# Patient Record
(demographics unavailable — no encounter records)

---

## 2019-07-25 NOTE — DIAGNOSTIC IMAGING REPORT
DATE: 7/25/2019 11:55 AM 



REASON FOR EXAM:  ABD PAIN.  Evaluate for appendicitis.



COMPARISON:  None



TECHNIQUE: Focused abdominal sonogram in the right lower

quadrant.



FINDINGS: The appendix is not visualized on this abdominal

ultrasound due to overlying bowel gas. However, no secondary

signs of acute appendicitis are visualized. No free fluid or

loculated fluid collections.



IMPRESSION: 

1. Nonvisualization of the appendix without secondary signs of

acute appendicitis. If continued clinical concern, recommend CT

abdomen and pelvis to further characterize.



Dictated by: 



  Dictated on workstation # [id number]

## 2019-07-26 NOTE — DIAGNOSTIC IMAGING REPORT
PROCEDURE: CT urinary tract, rule out kidney stone.



TECHNIQUE: Multiple contiguous axial images were obtained through

the abdomen and pelvis without the use of intravenous contrast.

Auto Exposure Controls were utilized during the CT exam to meet

ALARA standards for radiation dose reduction. 



INDICATION:  Abdominal pain.



FINDINGS: No comparison available.



Limited views of the lower thorax are normal.



Liver is normal. No focal liver lesions are seen. Gallbladder is

normal. No biliary ductal dilation. Pancreas, spleen and adrenal

glands are normal.



There is an obstructing 3 mm distal right ureteral stone with

mild right-sided hydroureter. No left-sided stones are seen.

Urinary bladder is normal. Uterus and adnexa are normal.



There are no dilated loops of large or small bowel. Appendix is

normal. No abdominal or pelvic lymphadenopathy. No free fluid or

air.



There are no suspicious osseous lesions. There are bilateral L5

pars defects.



IMPRESSION:

1. Obstructing 3 mm distal right ureteral stone with mild

right-sided hydroureter.









Dictated by: 



  Dictated on workstation # [id number]

## 2019-07-26 NOTE — DIAGNOSTIC IMAGING REPORT
INDICATION: Abdominal pain.



COMPARISON: None



FINDINGS: Supine and upright views of the abdomen show a

nondistended bowel gas pattern. Moderate colonic air and stool is

noted. No abnormal air fluid levels or free intraperitoneal air

is seen. No abnormal extraosseous calcifications are seen.

Osseous structures show moderate levoscoliotic deformity of the

lumbar spine with mild compensatory dextroscoliosis of the

thoracic spine. No organomegaly is identified.



Accompanying upright chest shows normal heart size and pulmonary

vascularity. The lungs are well aerated and clear. The

mediastinum is normal in appearance. 



IMPRESSION:   

1.  No bowel obstruction or free air.  

2.  Normal chest. No pneumonia or pulmonary edema.

3.  S shape scoliotic deformity of the thoracolumbar spine.



Dictated by: 



  Dictated on workstation # [id number]

## 2019-07-26 NOTE — ED ABDOMINAL PAIN
General


Chief Complaint:  Back Problems


Stated Complaint:  ABD PAIN


Nursing Triage Note:  


C/O RIGHT FLANK PAIN


Sepsis Screen:  No Definite Risk


Source of Information:  Patient





History of Present Illness


Date Seen by Provider:  2019


Time Seen by Provider:  04:49


Initial Comments


PT ARRIVES VIA POV FROM HOME


STATES SHE WOKE UP AT 0500 YESTERDAY WITH PAIN IN RLQ, RADIATING TO RIGHT GROIN 

AREA


PAIN COMES AND GOES, NOTHING WORSENS OR IMPROVES PAIN--BUT HAS NOT TAKEN 

ANYTHING FOR PAIN 


STATES SHE WOKE UP THIS AM AT 0400 AND PAIN WAS MUCH WORSE AND IS NOW MOSTLY IN 

RIGHT FLANK PAIN AND IS CONSTANT


NOW HAVING NAUSEA


NO FEVER


NO URINARY SYMPTOMS 


LMP--SOMETIME IN . NO BIRTH CONTROL. PT IS . 





PT WENT TO [facility] YESTERDAY AND HAD OUTPATIENT ULTRASOUND OF 

APPENDIX ONLY--WAS NON-DIAGNOSTIC. ( PT DID GIVE A DIFFERENT NAME AT THAT 

TIME--TONY DOHERTY--UNABLE TO FIND PT IN COMPUTER UNDER THAT NAME OR TONY CADET, BUT [employer] WAS ABLE TO RETRIEVE THE ULTRASOUND REPORT )





PCP: DR. SANCHEZ'S NP, JONATHAN LANG





Allergies and Home Medications


Allergies


Coded Allergies:  


     Penicillins (Verified  Allergy, Unknown, 19)





Patient Home Medication List


Home Medication List Reviewed:  Yes





Review of Systems


Review of Systems


Constitutional:  no symptoms reported


Respiratory:  No Symptoms Reported


Cardiovascular:  No Symptoms Reported


Gastrointestinal:  See HPI, Abdominal Pain; Denies Constipated, Denies Diarrhea;

Nausea; Denies Vomiting


Genitourinary:  Flank Pain


Musculoskeletal:  see HPI, back pain


Skin:  no symptoms reported


Psychiatric/Neurological:  No Symptoms Reported


Endocrine:  No Symptoms Reported


Hematologic/Lymphatic:  No Symptoms Reported





Past Medical-Social-Family Hx


Patient Social History


Alcohol Use:  Denies Use


Recreational Drug Use:  No


Smoking Status:  Never a Smoker


2nd Hand Smoke Exposure:  No


Recent Foreign Travel:  No


Contact w/Someone Who Travel:  No


Recent Infectious Disease Expo:  No


Recent Hopitalizations:  No


Physical Abuse:  No


Sexual Abuse:  No


Mistreated:  No


Fear:  No





Seasonal Allergies


Seasonal Allergies:  No





Past Medical History


Surgeries:  No


Respiratory:  No


Cardiac:  No


Neurological:  No


Pregnant:  No


Last Menstrual Period:  2019


Hx :  0


Reproductive Disorders:  No (TAKES SPIRONOLACTONE FOR UNKNOWN REASON--PRESCRIBED

BY NP JONATHAN SANCHEZ)


Genitourinary:  No


Gastrointestinal:  No


Musculoskeletal:  No


Endocrine:  No


HEENT:  No


Cancer:  No


Psychosocial:  No


Integumentary:  No


Blood Disorders:  No





Physical Exam


Vital Signs





Vital Signs - First Documented








 19





 04:51


 


Temp 97.9


 


Pulse 81


 


Resp 18


 


B/P (MAP) 132/85 (101)


 


Pulse Ox 97





Capillary Refill : Less Than 3 Seconds


Height/Weight/BMI


Height: 5'1.00"


Weight: 152lbs. oz. 68.610929gf;  BMI


Method:Stated


General Appearance:  WD/WN, no apparent distress


Respiratory:  normal breath sounds, no respiratory distress, no accessory muscle

use


Cardiovascular:  regular rate, rhythm, no murmur


Gastrointestinal:  normal bowel sounds, soft, no organomegaly, no pulsatile 

mass; No distended, No guarding, No rebound; tenderness (MILD RLQ TENDERNESS, MO

DERATE RIGHT FLANK TENDERNESS); No hernia, No mass


Extremities:  normal inspection


Back:  CVA tenderness (R)


Neurologic/Psychiatric:  CNs II-XII nml as tested, no motor/sensory deficits, 

alert, normal mood/affect, oriented x 3


Skin:  normal color, warm/dry; No rash





Progress/Results/Core Measures


Results/Orders


Lab Results





Laboratory Tests








Test


 19


04:58 19


05:05 Range/Units


 


 


White Blood Count


 


 8.9 


 4.3-11.0


10^3/uL


 


Red Blood Count


 


 4.66 


 4.35-5.85


10^6/uL


 


Hemoglobin  13.1  11.5-16.0  G/DL


 


Hematocrit  40  35-52  %


 


Mean Corpuscular Volume  86  80-99  FL


 


Mean Corpuscular Hemoglobin  28  25-34  PG


 


Mean Corpuscular Hemoglobin


Concent 


 33 


 32-36  G/DL





 


Red Cell Distribution Width  12.7  10.0-14.5  %


 


Platelet Count


 


 297 


 130-400


10^3/uL


 


Mean Platelet Volume  10.6 H 7.4-10.4  FL


 


Neutrophils (%) (Auto)  50  42-75  %


 


Lymphocytes (%) (Auto)  39  12-44  %


 


Monocytes (%) (Auto)  10  0-12  %


 


Eosinophils (%) (Auto)  2  0-10  %


 


Basophils (%) (Auto)  0  0-10  %


 


Neutrophils # (Auto)  4.4  1.8-7.8  X 10^3


 


Lymphocytes # (Auto)  3.5  1.0-4.0  X 10^3


 


Monocytes # (Auto)  0.9  0.0-1.0  X 10^3


 


Eosinophils # (Auto)


 


 0.1 


 0.0-0.3


10^3/uL


 


Basophils # (Auto)


 


 0.0 


 0.0-0.1


10^3/uL


 


Sodium Level  142  135-145  MMOL/L


 


Potassium Level  3.6  3.6-5.0  MMOL/L


 


Chloride Level  105    MMOL/L


 


Carbon Dioxide Level  23  21-32  MMOL/L


 


Anion Gap  14  5-14  MMOL/L


 


Blood Urea Nitrogen  13  7-18  MG/DL


 


Creatinine


 


 0.77 


 0.60-1.30


MG/DL


 


Estimat Glomerular Filtration


Rate 


 > 60 


  





 


BUN/Creatinine Ratio  17   


 


Glucose Level  103    MG/DL


 


Calcium Level  10.3 H 8.5-10.1  MG/DL


 


Corrected Calcium  10.0  8.5-10.1  MG/DL


 


Total Bilirubin  0.3  0.1-1.0  MG/DL


 


Aspartate Amino Transf


(AST/SGOT) 


 24 


 5-34  U/L





 


Alanine Aminotransferase


(ALT/SGPT) 


 25 


 0-55  U/L





 


Alkaline Phosphatase  72    U/L


 


Total Protein  7.6  6.4-8.2  GM/DL


 


Albumin  4.4  3.2-4.5  GM/DL


 


Amylase Level  66    U/L


 


Lipase  23  8-78  U/L


 


Serum Pregnancy Test,


Qualitative 


 NEGATIVE 


 NEGATIVE  











My Orders





Orders - AMAN SMITH DO


Urine Pregnancy Bedside (19 04:49)


Drug Screen Stat (Urine) (19 04:49)


Ua Culture If Indicated (19 04:49)


Ed Iv/Invasive Line Start (19 04:54)


Ct Abd/Pelvis Wo(Kidney Stone) (19 04:54)


Acute Abd Series (19 04:54)


Amylase (19 04:54)


Cbc With Automated Diff (19 04:54)


Comprehensive Metabolic Panel (19 04:54)


Lipase (19 04:54)


Ed Iv/Invasive Line Start (19 04:54)


Lactated Ringers (Lr 1000 Ml Iv Solution (19 04:54)


Hcg,Qualitative Serum (19 05:13)


Ketorolac Injection (Toradol Injection) (19 05:30)


Ondansetron Injection (Zofran Injectio (19 05:30)





Medications Given in ED





Current Medications








 Medications  Dose


 Ordered  Sig/Emilio


 Route  Start Time


 Stop Time Status Last Admin


Dose Admin


 


 Ketorolac


 Tromethamine  30 mg  ONCE  ONCE


 IVP  19 05:30


 19 05:32 DC 19 05:40


30 MG


 


 Lactated Ringer's  1,000 ml @ 


 0 mls/hr  Q0M ONCE


 IV  19 04:54


 19 04:56 DC 19 05:22


1,000 MLS/HR


 


 Ondansetron HCl  8 mg  ONCE  ONCE


 IVP  19 05:30


 19 05:32 DC 19 05:40


8 MG








Vital Signs/I&O











 19





 04:51


 


Temp 97.9


 


Pulse 81


 


Resp 18


 


B/P (MAP) 132/85 (101)


 


Pulse Ox 97














Blood Pressure Mean:                    101











Progress


Progress Note :  


Progress Note


BEGAN VOMITING SHORTLY AFTER ARRIVAL. GAVE ZOFRAN, AND THEN TORADOL FOR PAIN, 

AFTER RETURN OF NEGATIVE PREGNANCY TEST. 


NAUSEA/VOMITING RESOLVED WITH ZOFRAN


MODERATE RELIEF OF PAIN WITH TORADOL


GIVEN FENTANYL WITH FURTHER IMPROVEMENT IN PAIN





Diagnostic Imaging





Comments


ABDOMEN XRAYS--NO ACUTE PROCESS, PER RADIOLOGIST REPORT AT 0610


CT ABDOMEN / PELVIS--3 MM RIGHT DISTAL URETERAL STONE WITH MILD RIGHT SIDED 

HYDRONEPHROSIS AND MILD PERINEPHRIC AND PERIURETERAL STRANDING. --PER [facility] 

VIA FAX AT 0625


   Reviewed:  Reviewed by Me





Departure


Impression





   Primary Impression:  


   Right ureteral calculus


Disposition:   HOME, SELF-CARE


Condition:  Improved





Departure-Patient Inst.


Referrals:  


LAWANDA SANCHEZ DO (PCP)


Primary Care Physician








MARGOT SANCHEZ, LUCIUS (Family)


Primary Care Physician








TAWIL,ELIAS A MD


Patient Instructions:  How to Strain Your Urine, Kidney Stones (DC)





Add. Discharge Instructions:  


LOTS OF CLEAR LIQUIDS-DRINK ENOUGH SO YOU ARE URINATING EVERY 2-3 HOURS WHILE 

AWAKE





STRAIN ALL URINE--RETURN ANY STONES TO YOUR DR'S OFFICE





FOLLOW UP WITH DR. TAWIL OR YOUR DR ON MONDAY FOR FURTHER CARE


RETURN TO ER IF WORSE





All discharge instructions reviewed with patient and/or family. Voiced 

understanding.


Scripts


Tamsulosin HCl (Flomax) 0.4 Mg Cap


0.4 MG PO DAILY, #10 CAP


   Prov: AMAN SMITH DO         19 


Ondansetron (Ondansetron Odt) 8 Mg Tab.rapdis


8 MG PO Q6H for Nausea/Vomiting, #10 TAB


   Prov: AMAN SMITH DO         19 


Hydrocodone/Ibuprofen (Hydrocodone-Ibuprofen 7.5-200) 1 Each Tablet


1 EACH PO Q4H PRN for PAIN-MODERATE for 3 Days, #20 TAB


   Prov: AMAN SMITH DO         19 


Nitrofurantoin Monohyd/M-Cryst (Macrobid 100 mg Capsule) 100 Mg Capsule


100 MG PO BID, #20 CAP


   Prov: AMAN SMITH DO         19











AMAN SMITH DO                 2019 06:03

## 2019-07-30 NOTE — DIAGNOSTIC IMAGING REPORT
INDICATION: Right ureteral stone.



TIME OF EXAM: 1:45 PM



COMPARISON: No prior studies are available for comparison.



FINDINGS:  

Moderate stool in the colon is seen. No definite calcific

densities are identified overlying the renal shadows. There is a

right pelvic calcification somewhat medially located measuring

approximately 2 mm in size. This may represent a ureteral

calculus. No other significant abnormality is seen.



IMPRESSION:

Questionable distal right ureteral calculus. The study is

otherwise unremarkable.



Dictated by: 



  Dictated on workstation # [id number]

## 2019-07-31 NOTE — XMS REPORT
[facility]

                             Created on: 2018



Nazia rByan

External Reference #: [id number]

: 1998

Sex: Female



Demographics







                          Address                   [address] [place]  [address]

 

                          Preferred Language        Unknown

 

                          Marital Status            Unknown

 

                          Pentecostalism Affiliation     Unknown

 

                          Race                      Unknown

 

                          Ethnic Group              Unknown





Author







                          Author                    JADYN ROBIN

 

                          [facility]

 

                          Address                   [address] [place]  [address]



 

                          Phone                     [phone]







Care Team Providers







                    Care Team Member Name    Role                Phone

 

                    JADYN ROBIN    Unavailable         [phone]







PROBLEMS







          Type      Condition    ICD9-CM Code    MJL89-ED Code    Onset Dates    Condition Status    SNOMED

 Code

 

           Problem    Counseling on other sexually transmitted diseases    V65.45                           Active

                                        [id number]

 

          Problem    Irregular menstrual cycle    626.4                         Active    [id number]

 

          Problem    Scoliosis (and kyphoscoliosis), idiopathic    737.30                        Active    25463936



 

                    Problem             General counseling for initiation of other contraceptive measures    V25.02

                                                Active          [id number]

 

          Problem    Unspecified contraceptive management    V25.9                         Active    [id number]

 

          Problem    DTAP TEST    V06.1                         Active     

 

                Problem         Other general medical examination for administrative purposes    V70.3            

                                        Active              [id number]







ALLERGIES

No Known Allergies



ENCOUNTERS







                Encounter       Location        Date            Diagnosis

 

                          [facility]    [address], [place] [address]

                          11 Mar, 2018              Bilateral impacted cerumen H61.23

 

                          [facility]     [id number] [place] [id number]

                                         

 

                          [facility]     [id number] [place] [phone]

                                         

 

                          [facility]     [id number] [place] [id number]

                          25 Oct, 2013               

 

                          [facility]     [id number] [place] [id number]

                          25 Oct, 2013               

 

                          [facility]     [id number] [place] [phone]

                                         

 

                          [facility]     [id number] [place] [phone]

                          25 Sep, 2012               

 

                          [facility]     [id number] [address]

                          19 Sep, 2012               

 

                          [facility]     [address] [facility] [id number] [address]

                                         







IMMUNIZATIONS

No Known Immunizations



SOCIAL HISTORY

Never Assessed



REASON FOR VISIT

decreased hearing left ear about 1 week JStrasserRN



PLAN OF CARE







                          Activity                  Details









                                         









                          Follow Up                 prn Reason:







VITAL SIGNS







                    Height              62.5 in             2018

 

                    Weight              143.6 lbs           2018

 

                    Temperature         98.5 degrees Fahrenheit    2018

 

                    Heart Rate          80 bpm              2018

 

                    Respiratory Rate    20                  2018

 

                    BMI                 25.84 kg/m2         2018

 

                    Blood pressure systolic    100 mmHg            2018

 

                    Blood pressure diastolic    62 mmHg             2018







MEDICATIONS

No Known Medications



RESULTS

No Results



PROCEDURES







                Procedure       Date Ordered    Result          Body Site

 

                EAR LAVAGE      2018      N/A              







INSTRUCTIONS





MEDICATIONS ADMINISTERED

No Known Medications

## 2019-07-31 NOTE — DISCHARGE INST-UROLOGY
Discharge Inst-Urology


Reconcile Patient Problems


Problems Reviewed?:  Yes





Discharge Medications


New, Converted, or Re-newed RX:  RX on Chart





Patient Instructions/Follow Up


Plan


Please make appointment to been seen in office in 4 weeks.





Increase oral fluids for 48 hours and then as needed.





Diet and Activity as tolerated.





If questions or concerns contact your physician 


Or seek help at emergency department.











TAWIL,ELIAS A MD               Jul 31, 2019 11:00

## 2019-07-31 NOTE — OPERATIVE REPORT
DATE OF SERVICE:  07/31/2019



PREOPERATIVE DIAGNOSIS:

Right distal ureteral stone.



POSTOPERATIVE DIAGNOSIS:

Right distal ureteral stone.



OPERATION PERFORMED:

Right ureteroscopy, passage of the basket and retrograde urogram.



SURGEON:

Elias Tawil, MD



ANESTHESIA:

General.



COMPLICATIONS:

None.



DESCRIPTION OF PROCEDURE:

Under satisfactory general anesthesia, the patient in lithotomy position,

genitalia were prepped and draped in the usual sterile fashion.  Cystoscope was

introduced under vision.  The bladder was normal with bilateral clear effluxes. 

Using the foroblique lens, I dilated the right ureteral orifice intramural

portion to accommodate a 6.9 Slov[place] semirigid ureteroscope, went up to the level

where the stone was presumably and beyond it.  I could not visualize any stone. 

I went ahead and backed up and passed the basket, Angulo basket under vision and

fluoroscopy three or four times, met no resistance and no stones.  I injected

contrast through the ureteroscope.  The contrast flew very easily to the kidney.

 There was no filling defect and complete emptying of the system on withdrawing

the ureteroscope.  I discontinued further attempt.  Introduced the cystoscope

again to empty the bladder.  The patient tolerated the procedure and anesthesia

well and was sent to recovery room in stable condition.



PLAN:

I told to the  and her mother-in-law that she either passed the stone or

the stone moved up.  We have to see how she does over the next few days.  Her

ureter now was dilated and if there is still stone there it is easier to pass. 

We will see how she does in 4 weeks.  She is to call or come to the emergency

room with any problems.





Job ID: [id number]

DocumentID: [id number]

Dictated Date:  07/31/2019 13:04:24

Transcription Date: 07/31/2019 17:31:01

Dictated By: ELIAS TAWIL, MD

## 2019-07-31 NOTE — XMS REPORT
[facility]

                             Created on: 2019



Nazia Bryan

External Reference #: [id number]

: 1998

Sex: Female



Demographics







                          Address                   [address]

[place]  [address]

 

                          Preferred Language        Unknown

 

                          Marital Status            Unknown

 

                          Spiritism Affiliation     Unknown

 

                          Race                      Unknown

 

                          Ethnic Group              Unknown





Author







                          Author                    JASMINE CALDWELL

 

                          Organization              [facility] [place]

 

                          Address                   [address] [place]  [address]



 

                          Phone                     [phone]







Care Team Providers







                    Care Team Member Name    Role                Phone

 

                    JASMINE CALDWELL    Unavailable         [phone]







PROBLEMS







          Type      Condition    ICD9-CM Code    OTC93-GO Code    Onset Dates    Condition Status    SNOMED

 Code

 

           Problem    Counseling on other sexually transmitted diseases    V65.45                           Active

                                        [id number]

 

          Problem    Scoliosis (and kyphoscoliosis), idiopathic    737.30                        Active    [id number]



 

          Problem    Irregular menstrual cycle    626.4                         Active    [id number]

 

          Problem    Unspecified contraceptive management    V25.9                         Active    [id number]

 

                    Problem             General counseling for initiation of other contraceptive measures    V25.02

                                                Active          [id number]

 

                Problem         Other general medical examination for administrative purposes    V70.3            

                                        Active              [id number]

 

          Problem    DTAP TEST    V06.1                         Active     







ALLERGIES

No Information



ENCOUNTERS







                Encounter       Location        Date            Diagnosis

 

                          [facility]    [id number], [place] [address]

                          11 Mar, 2018              Bilateral impacted cerumen H61.23

 

                          [facility] [place]     [id number] [place] [phone]

                                         

 

                          [facility] [place]     [id number] [address]

                                         

 

                          [facility] [place]     [id number] [place] [phone]

                          25 Oct, 2013               

 

                          [facility] [place]     [address] [place] [phone]

                          25 Oct, 2013               

 

                          [facility] [place]     [id number] [place] [phone]

                                         

 

                          [facility] [place]     [id number] [address]

                          25 Sep, 2012               

 

                          [facility] [place]     [id number] [place] [address]

                          19 Sep, 2012               

 

                          [facility] [place]     [address] [facility] [id number] [place] [address]

                                         







IMMUNIZATIONS

No Known Immunizations



SOCIAL HISTORY

Never Assessed



REASON FOR VISIT





PLAN OF CARE





VITAL SIGNS







                    Height              61.2 in             2012

 

                    Weight              115.12 lbs          2012

 

                    Temperature         98.4 degrees Fahrenheit    2012

 

                    Heart Rate          87 bpm              2012

 

                    Respiratory Rate    16                  2012

 

                    Blood pressure systolic    104 mmHg            2012

 

                    Blood pressure diastolic    57 mmHg             2012







MEDICATIONS

No Known Medications



RESULTS

No Results



PROCEDURES







                Procedure       Date Ordered    Result          Body Site

 

                AUDIOMETRY-SCREEN    2012                     







INSTRUCTIONS





MEDICATIONS ADMINISTERED

No Known Medications

## 2019-07-31 NOTE — PROGRESS NOTE-PRE OPERATIVE
Pre-Operative Progress Note


H&P Reviewed


The H&P was reviewed, patient examined and no changes noted.


Date Seen by Provider:  Jul 31, 2019


Time Seen by Provider:  10:57


Date H&P Reviewed:  Jul 31, 2019


Time H&P Reviewed:  10:57


Pre-Operative Diagnosis:  RT DISTAL URETERAL STONE











TAWIL,ELIAS A MD               Jul 31, 2019 10:57

## 2019-07-31 NOTE — ANESTHESIA-GENERAL POST-OP
General


Patient Condition


Mental Status/LOC:  Same as Preop


Cardiovascular:  Satisfactory


Nausea/Vomiting:  Absent


Respiratory:  Satisfactory


Pain:  Controlled


Complications:  Absent





Post Op Complications


Complications


None





Follow Up Care/Instructions


Patient Instructions


None needed.





Anesthesia/Patient Condition


Patient Condition


Patient was seen after the procedure and she was doing well, no complaints, 

stable vital signs, no apparent adverse anesthesia problems.











MARY KAY DUGAN DO         Jul 31, 2019 14:09

## 2019-07-31 NOTE — XMS REPORT
Continuity of Care Document

                             Created on: 2019



TONY DOHERTY

External Reference #: [id number]

: 1998

Sex: Female



Demographics







                          Address                   [address]

 

                          Home Phone                [phone] x

 

                          Preferred Language        Unknown

 

                          Marital Status            Unknown

 

                          Nondenominational Affiliation     Unknown

 

                          Race                      Unknown

 

                          Ethnic Group              Unknown





Author







                          Organization              Unknown

 

                          Address                   Unknown

 

                          Phone                     Unavailable



              



Allergies

      





             Active              Description              Code              Type              Severity

                Reaction              Onset              Reported/Identified              Relationship

 to Patient                             Clinical Status        

 

             Yes              Penicillins                             Drug Allergy                   

                                           2009                                      

 

             Yes              Penicillins                             Drug Allergy              N/A  

             N/A                             2009                                     

 



                    



Medications

      



There is no data.                  



Problems

      





             Date Dx Coded              Attending              Type              Code              Diagnosis

                                        Diagnosed By        

 

             2008                                            V06.5              Dt, Tetanus-diphtheria

 [td]                                            

 

             2008              BANDAR BARRERA DO                             V06.5              

Dt, Tetanus-diphtheria [td]                       

 

                2008              SRIDHAR WOODALL MD                              V06.5         

                          Dt, Tetanus-diphtheria [td]                       

 

             2009                                            V20.2              Preventive Medicine

 New Patient Evaluation Childhood 5-11                       

 

             2009              BANDAR BARRERA DO                             V20.2              

Preventive Medicine New Patient Evaluation Childhood 5-11                       

 

                2009              SRIDHAR WOODALL MD                              V20.2         

                          Preventive Medicine New Patient Evaluation Childhood 5-11                       



 

             2010                                            V01.84              Meningococcal 

Vaccine                                          

 

             2010                                            V05.3              Hepatitis Viral/all

                                                 

 

                2010              BANDAR BARRERA DO                              V01.84           

                          Meningococcal Vaccine                       

 

             2010              BANDAR BARRERA DO                             V05.3              

Hepatitis Viral/all                              

 

                2010              SRIDHAR WOODALL MD                              V01.84        

                          Meningococcal Vaccine                       

 

                2010              SRIDHAR WOODALL MD                              V05.3         

                          Hepatitis Viral/all                       

 

             2012                                            626.4              IRREGULAR MENSTRUAL

 CYCLE                                           

 

             2012                                            V25.02              CONTRACEPTION 

- ANY METHOD                                     

 

             2012                                            V65.45              Counseling - Std

                                                 

 

             2012              BANDAR BARRERA DO                             626.4              

IRREGULAR MENSTRUAL CYCLE                        

 

                2012              BANDAR BARRERA DO                              V25.02           

                          CONTRACEPTION - ANY METHOD                       

 

                2012              BANDAR BARRERA DO                              V65.45           

                          Counseling - Std                       

 

                2012              SRIDHAR WOODALL MD                              626.4         

                          IRREGULAR MENSTRUAL CYCLE                       

 

                2012              SRIDHAR WOODALL MD                              V25.02        

                          CONTRACEPTION - ANY METHOD                       

 

                2012              SRIDHAR WOODALL MD                              V65.45        

                          Counseling - Std                       

 

             2012                                            737.30              SCOLIOSIS (AND

 KYPHOSCOLIOSIS) IDIOPATHIC                       

 

             2012                                            V70.3              OTHER GENERAL MEDICAL

 EXAMINATION FOR ADMINISTRATIVE PURPOSES                       

 

                2012              BANDAR BARRERA DO                              737.30           

                          SCOLIOSIS (AND KYPHOSCOLIOSIS) IDIOPATHIC                       

 

             2012              BANDAR BARRERA DO                             V70.3              

OTHER GENERAL MEDICAL EXAMINATION FOR ADMINISTRATIVE PURPOSES                      

 

 

                2012              SRIDHAR WOODALL MD                              737.30        

                          SCOLIOSIS (AND KYPHOSCOLIOSIS) IDIOPATHIC                       

 

                2012              SRIDHAR WOODALL MD                              V70.3         

                          OTHER GENERAL MEDICAL EXAMINATION FOR ADMINISTRATIVE PURPOSES                   

    

 

             2013                                            V06.1              TDAP DX        

                                                 

 

             2013              BANDAR BARRERA DO                             V06.1              

TDAP DX                                          

 

                2013              SRIDHAR WOODALL MD                              V06.1         

                          TDAP DX                            

 

                2014              SRIDHAR WOODALL MD                              V25.9         

                          CONTRACEPTION MANAGEMENT                       

 

                2019              KARSON WHITNEY FNSONYA-C              Ot              R10.31       

                          RIGHT LOWER QUADRANT PAIN                       



                                                                                



Procedures

      





                Code              Description              Performed By              Performed On     

   

 

                                      48759                                    THERAPUTIC INJ SQ/IM         

                                                    10/25/2013        

 

                                                                          DEPO PROVERA                 

                                                    10/25/2013        

 

                                      36084                                    URINE PREGNANCY TEST (IN-HOUSE)

                                                    10/25/2013        

 

                                      97044                                    PREGNANCY TEST, URINE (IN-HOUSE)

                                                    2014        

 

                                                                          DEPO PROVERA                 

                                                    2014        

 

                                      [id number]                                    THERAPUTIC INJ SQ/IM         

                                                    2014        



                            



Results

      



There is no data.              



Encounters

      





                ACCT No.              Visit Date/Time              Discharge              Status      

                Pt. Type              Provider              Facility              Loc./Unit      

                                        Complaint        

 

                [id number]              2014 17:58:00              2014 23:59:59              

[facility]              Outpatient              SRIDHAR WOODALL MD                              

                                                 

 

                [id number]              10/25/2013 14:44:00              10/25/2013 23:59:59              

[facility]              Outpatient              BANDAR BARRERA DO                                 

                                                 

 

                [id number]              2013 13:44:00              2013 23:59:59              

[facility]              Outpatient                                                                

    

 

                [id number]              2018 15:15:00              2018 23:59:59              CLS

                Outpatient              [facility]                               

 

                    [id number]              2019 11:17:00              2019 23:59:59      

                [facility]              Outpatient              KARSON WHITNEY FNP-C              [facility]              RAD                       ABD PAIN        

 

                    [id number]              2013 09:59:00              2013 23:59:59      

             CLS              Outpatient

## 2019-07-31 NOTE — PROGRESS NOTE-POST OPERATIVE
Post-Operative Progess Note


Surgeon (s)/Assistant (s)


Surgeon


ELIAS TAWIL MD


Assistant:  NONE





Pre-Operative Diagnosis


RT DISTAL URETERAL STONE





Post-Operative Diagnosis





SAME





Procedure & Operative Findings


Date of Procedure


7/31/19


Procedure Performed/Findings


RT URETEROSCOPY, BASKET AND RETROGRADE UROGRAM


Anesthesia Type


GENERAL





Estimated Blood Loss


Estimated blood loss (mL):  NONE





Specimens/Packing


Specimens Removed


NONE


Packing:  


NONE











TAWIL,ELIAS A MD               Jul 31, 2019 10:59

## 2025-03-04 NOTE — NUR
THIS RN PHONED DR. TAWIL TO SEE IF PATIENT NEEDS ANY PRESCRIPTIONS. DR. TAWIL INFORMED 
PATIENT HAS ALL MEDICATIONS AT HOME THAT SHE NEEDS. WILL CONTINUE TO DISCHARGE PATIENT. To get better and follow your care plan as instructed.